# Patient Record
Sex: FEMALE
[De-identification: names, ages, dates, MRNs, and addresses within clinical notes are randomized per-mention and may not be internally consistent; named-entity substitution may affect disease eponyms.]

---

## 2020-11-25 ENCOUNTER — DASHBOARD ENCOUNTERS (OUTPATIENT)
Age: 70
End: 2020-11-25

## 2020-11-25 ENCOUNTER — OFFICE VISIT (OUTPATIENT)
Dept: URBAN - METROPOLITAN AREA CLINIC 80 | Facility: CLINIC | Age: 70
End: 2020-11-25
Payer: MEDICARE

## 2020-11-25 ENCOUNTER — WEB ENCOUNTER (OUTPATIENT)
Dept: URBAN - METROPOLITAN AREA CLINIC 80 | Facility: CLINIC | Age: 70
End: 2020-11-25

## 2020-11-25 VITALS
DIASTOLIC BLOOD PRESSURE: 60 MMHG | SYSTOLIC BLOOD PRESSURE: 96 MMHG | HEART RATE: 77 BPM | BODY MASS INDEX: 20.97 KG/M2 | TEMPERATURE: 96.6 F | WEIGHT: 111 LBS

## 2020-11-25 DIAGNOSIS — R63.4 WEIGHT LOSS: ICD-10-CM

## 2020-11-25 PROCEDURE — G8420 CALC BMI NORM PARAMETERS: HCPCS | Performed by: INTERNAL MEDICINE

## 2020-11-25 PROCEDURE — 99203 OFFICE O/P NEW LOW 30 MIN: CPT | Performed by: INTERNAL MEDICINE

## 2020-11-25 PROCEDURE — G9903 PT SCRN TBCO ID AS NON USER: HCPCS | Performed by: INTERNAL MEDICINE

## 2020-11-25 PROCEDURE — G8427 DOCREV CUR MEDS BY ELIG CLIN: HCPCS | Performed by: INTERNAL MEDICINE

## 2020-11-25 RX ORDER — CLONAZEPAM 1 MG/1
1 TABLET TABLET ORAL BID
Status: ACTIVE | COMMUNITY

## 2020-11-25 RX ORDER — TOPIRAMATE 50 MG/1
1 TABLET TABLET, FILM COATED ORAL ONCE A DAY
Status: ACTIVE | COMMUNITY

## 2020-11-25 RX ORDER — OMEPRAZOLE 40 MG/1
1 CAPSULE 30 MINUTES BEFORE MORNING MEAL CAPSULE, DELAYED RELEASE ORAL ONCE A DAY
Status: ACTIVE | COMMUNITY

## 2020-11-25 NOTE — HPI-TODAY'S VISIT:
She comes in today to discuss unintentional weight loss.  She is visiting here from [place].  She has lost over 20 pounds in the past month.  She notes a mild decrease in appetite.  She is also noting vision changes and she is undergoing a neurology evaluation currently.  She has no GI complaints in general.  She did have a colonoscopy 2 years ago which showed diverticulosis and a recent EGD which was unremarkable.

## 2020-12-12 ENCOUNTER — OFFICE VISIT (OUTPATIENT)
Dept: URBAN - METROPOLITAN AREA SURGERY CENTER 19 | Facility: SURGERY CENTER | Age: 70
End: 2020-12-12
Payer: MEDICARE

## 2020-12-12 DIAGNOSIS — R63.4 ABNORMAL INTENTIONAL WEIGHT LOSS: ICD-10-CM

## 2020-12-12 DIAGNOSIS — K57.30 ACQUIRED DIVERTICULOSIS OF COLON: ICD-10-CM

## 2020-12-12 PROCEDURE — G8907 PT DOC NO EVENTS ON DISCHARG: HCPCS | Performed by: INTERNAL MEDICINE

## 2020-12-12 PROCEDURE — 45378 DIAGNOSTIC COLONOSCOPY: CPT | Performed by: INTERNAL MEDICINE

## 2020-12-12 PROCEDURE — G9937 DIG OR SURV COLSCO: HCPCS | Performed by: INTERNAL MEDICINE

## 2020-12-12 RX ORDER — CLONAZEPAM 1 MG/1
1 TABLET TABLET ORAL BID
Status: ACTIVE | COMMUNITY

## 2020-12-12 RX ORDER — TOPIRAMATE 50 MG/1
1 TABLET TABLET, FILM COATED ORAL ONCE A DAY
Status: ACTIVE | COMMUNITY

## 2020-12-12 RX ORDER — OMEPRAZOLE 40 MG/1
1 CAPSULE 30 MINUTES BEFORE MORNING MEAL CAPSULE, DELAYED RELEASE ORAL ONCE A DAY
Status: ACTIVE | COMMUNITY

## 2024-04-13 NOTE — PHYSICAL EXAM CONSTITUTIONAL:
well developed, well nourished , in no acute distress , ambulating without difficulty , normal communication ability
0 (no pain/absence of nonverbal indicators of pain)